# Patient Record
Sex: MALE | Race: WHITE
[De-identification: names, ages, dates, MRNs, and addresses within clinical notes are randomized per-mention and may not be internally consistent; named-entity substitution may affect disease eponyms.]

---

## 2022-02-02 ENCOUNTER — HOSPITAL ENCOUNTER (OUTPATIENT)
Dept: HOSPITAL 47 - ORWHC2ENDO | Age: 67
Discharge: HOME | End: 2022-02-02
Attending: INTERNAL MEDICINE
Payer: MEDICARE

## 2022-02-02 VITALS — HEART RATE: 48 BPM | SYSTOLIC BLOOD PRESSURE: 126 MMHG | DIASTOLIC BLOOD PRESSURE: 83 MMHG

## 2022-02-02 VITALS — TEMPERATURE: 97.6 F | RESPIRATION RATE: 16 BRPM

## 2022-02-02 VITALS — BODY MASS INDEX: 26.4 KG/M2

## 2022-02-02 DIAGNOSIS — Z12.11: Primary | ICD-10-CM

## 2022-02-02 PROCEDURE — 88305 TISSUE EXAM BY PATHOLOGIST: CPT

## 2022-02-02 PROCEDURE — 45385 COLONOSCOPY W/LESION REMOVAL: CPT

## 2022-02-02 NOTE — P.PCN
Date of Procedure: 02/02/22


Procedure(s) Performed: 


BRIEF HISTORY: Patient is a 66-year-old pleasant white male scheduled for an 

elective colonoscopy as a part of screening for colorectal neoplasia.





PROCEDURE PERFORMED: Colonoscopy with biopsy. 





PREOPERATIVE DIAGNOSIS: Screening for colon cancer. 





IV sedation per Anesthesia. 





PROCEDURE: After informed consent was obtained, the patient, was brought into 

the endoscopy unit. IV sedation was administered by Anesthesia under continuous 

monitoring.  Digital rectal examination was normal. Initially the Olympus CF-160

flexible video colonoscope was then inserted in the rectum, gradually advanced 

into the cecum without any difficulty. Careful examination was performed as the 

scope was gradually being withdrawn. Ileocecal valve and the appendiceal orifice

were visualized and appeared normal.  Prep was excellent. Mucosa of the cecum, 

ascending colon, appeared normal.  In the transverse colon there was a 3-4 mm 

sessile polyp that was removed by cold biopsy.  Rest of the transverse colon, 

descending colon, sigmoid colon, and rectum appeared normal.  Scattered sigmoid 

diverticulosis seen.  Retroflexion was performed in the rectum and small 

internal were seen. The patient tolerated the procedure well. 





IMPRESSION: 


3-4 mm sessile transverse colon polyp status post cold biopsy


Scattered sigmoid diverticulosis 


Small internal hemorrhoids








RECOMMENDATIONS:  Findings of this examination were discussed with the patient 

as well as his family..He was advised to follow with the biopsy results.  If the

biopsy reveals adenoma he can have a repeat colonoscopy in 5 years

## 2023-07-16 ENCOUNTER — HOSPITAL ENCOUNTER (EMERGENCY)
Dept: HOSPITAL 47 - EC | Age: 68
Discharge: HOME | End: 2023-07-16
Payer: MEDICAID

## 2023-07-16 VITALS — TEMPERATURE: 98.2 F | RESPIRATION RATE: 18 BRPM

## 2023-07-16 VITALS — HEART RATE: 54 BPM | DIASTOLIC BLOOD PRESSURE: 93 MMHG | SYSTOLIC BLOOD PRESSURE: 143 MMHG

## 2023-07-16 DIAGNOSIS — S61.432A: Primary | ICD-10-CM
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
## 2023-07-16 DIAGNOSIS — Z23: ICD-10-CM

## 2023-07-16 PROCEDURE — 90471 IMMUNIZATION ADMIN: CPT

## 2023-07-16 PROCEDURE — 99282 EMERGENCY DEPT VISIT SF MDM: CPT

## 2023-07-16 PROCEDURE — 90715 TDAP VACCINE 7 YRS/> IM: CPT

## 2023-07-16 NOTE — ED
Skin/Abscess/FB HPI





- General


Chief complaint: Skin/Abscess/Foreign Body


Stated complaint: foreign object


Time Seen by Provider: 07/16/23 07:41


Source: patient, RN notes reviewed, old records reviewed


Mode of arrival: ambulatory


Limitations: no limitations





- History of Present Illness


Initial comments: 





68-year-old well-appearing male presents to the emergency room with a puncture 

wound from a nail that he sustained yesterday at 3 PM.  Patient states was 

removing deck boards at the time.  Increased pain and swelling today.  States 

his wife is a nurse and was told to come in for a tetanus shot.  Denies any 

other injuries. 


MD complaint: other (puncture wound)


-: hour(s) (15)


Tetanus Up to Date: no


Location: L hand (thenar surface left hand)


Severity scale (1-10): 2


Quality: aching


Improves with: none


Worsens with: movement


Context: other (puncture from nail)


Treatments Prior to Arrival: bandages





- Related Data


                                Home Medications











 Medication  Instructions  Recorded  Confirmed


 


Immune Defense 1 tab PO DAILY 01/28/22 


 


Prostate Supplement 1 tab PO DAILY 01/28/22 








                                  Previous Rx's











 Medication  Instructions  Recorded


 


Cephalexin [Keflex] 500 mg PO Q6HR 7 Days #28 cap 07/16/23











                                    Allergies











Allergy/AdvReac Type Severity Reaction Status Date / Time


 


No Known Allergies Allergy   Verified 07/16/23 07:50














Review of Systems


ROS Statement: 


Those systems with pertinent positive or pertinent negative responses have been 

documented in the HPI.





ROS Other: All systems not noted in ROS Statement are negative.





Past Medical History


Past Medical History: Prostate Disorder


Additional Past Medical History / Comment(s): BPH


History of Any Multi-Drug Resistant Organisms: None Reported


Past Surgical History: No Surgical Hx Reported


Additional Past Surgical History / Comment(s): colonoscopy


Past Anesthesia/Blood Transfusion Reactions: No Reported Reaction


Past Psychological History: No Psychological Hx Reported


Smoking Status: Never smoker


Past Alcohol Use History: Rare


Past Drug Use History: None Reported





- Past Family History


  ** Mother


Family Medical History: Diabetes Mellitus





General Exam


Limitations: no limitations


General appearance: alert, in no apparent distress


Head exam: Present: atraumatic


Eye exam: Present: normal appearance.  Absent: scleral icterus, periorbital 

swelling


Respiratory exam: Absent: respiratory distress, accessory muscle use


Cardiovascular Exam: Present: bradycardia


  ** Left


Forearm Wrist exam: Present: full ROM


Hand Wrist exam: Present: full ROM, tenderness (thenar surface), other (puncture

 wound)


Neuro motor exam: Present: wrist extension intact, thumb opposition intact, 

thumb IP flexion intact, thumb adduction intact, fingers 2-5 abduction intact


Neurosensory exam: Present: radial nerve intact, ulnar nerve intact, median 

nerve intact


Vascular: Present: normal capillary refill.  Absent: vascular compromise


Neurological exam: Present: alert, oriented X3, normal gait


Psychiatric exam: Present: normal affect, normal mood


Skin exam: Present: warm, dry, normal color.  Absent: cyanosis, diaphoretic, 

petechiae, pallor





Course


                                   Vital Signs











  07/16/23 07/16/23





  07:45 08:34


 


Temperature 98.2 F 


 


Pulse Rate 57 L 54 L


 


Respiratory 18 18





Rate  


 


Blood Pressure 124/85 143/93


 


O2 Sat by Pulse 95 97





Oximetry  














Medical Decision Making





- Medical Decision Making











Was pt. sent in by a medical professional or institution ([facility], NP, urgent 

care, hospital, or nursing home...) When possible be specific


@  -[No]


Did you speak to anyone other than the patient for history (EMS, parent, family,

 police, friend...)? What history was obtained from this source 


@  -[No]


Did you review nursing and triage notes (agree or disagree)?  Why? 


@  -[I reviewed and agree with nursing and triage notes]


Were old charts reviewed (outside hosp., previous admission, EMS record, old 

EKG, old radiological studies, urgent care reports/EKG's, nursing home records)?

Report findings 


@  -[No old charts were reviewed]


Differential Diagnosis (chest pain, altered mental status, abdominal pain women,

abdominal pain men, vaginal bleeding, weakness, fever, dyspnea, syncope, 

headache, dizziness, GI bleed, back pain, seizure, CVA, palpatations, mental 

health, musculoskeletal)? 


@  foreign body, cellulitis, fracture   


EKG interpreted by me (3pts min.).


@  -n/a


X-rays interpreted by me (1pt min.).


@  -[None done]


CT interpreted by me (1pt min.).


@  -[None done]


U/S interpreted by me (1pt. min.).


@  -[None done]


What testing was considered but not performed or refused? (CT, X-rays, U/S, 

labs)? Why?


@  -xr considered however pt states puncture was superficial and has full range 

of motion


What meds were considered but not given or refused? Why?


@  -[None]


Did you discuss the management of the patient with other professionals 

(professionals i.e. , PA, NP, lab, RT, psych nurse, , , 

teacher, , )? Give summary


@  -[No]


Was smoking cessation discussed for >3mins.?


@  -[No]


Was critical care preformed (if so, how long)?


@  -[No]


Were there social determinants of health that impacted care today? How? 

(Homelessness, low income, unemployed, alcoholism, drug addiction, 

transportation, low edu. Level, literacy, decrease access to med. care, longterm, r

ehab)?


@  -[No]


Was there de-escalation of care discussed even if they declined (Discuss DNR or 

withdrawal of care, Hospice)? DNR status


@  -[No]


What co-morbidities impacted this encounter? (DM, HTN, Smoking, COPD, CAD, 

Cancer, CVA, ARF, Chemo, Hep., AIDS, mental health diagnosis, sleep apnea, 

morbid obesity)?


@  -DM


Was patient admitted / discharged? Hospital course, mention meds given and 

route, prescriptions, significant lab abnormalities, going to OR and other 

pertinent info.


@  -discharged


 68-year-old well-appearing male presents to the emergency room with a puncture 

wound from a nail that he sustained yesterday at 3 PM.  Patient states was 

removing deck boards at the time.  Increased pain and swelling today.  States 

his wife is a nurse and was told to come in for a tetanus shot.  Denies any 

other injuries. 


Patient was given a tetanus shot and a dose of antibiotics.  Prescribed Keflex 

and instructed to follow-up is primary care doctor next week.  Directed to soak 

hand in warm soapy water twice a day for next 2 days. Return with a concerning 

symptoms including increased redness, drainage, pain or fevers.  He is agreeable

to this plan of care.  Case discussed with Dr. Lau.  Medical history of 

diabetes and BPH


Undiagnosed new problem with uncertain prognosis?


@  -[No]


Drug Therapy requiring intensive monitoring for toxicity (Heparin, Nitro, 

Insulin, Cardizem)?


@  -[No]


Were any procedures done?


@  -[No]


Diagnosis/symptom?


@  puncture wound


Acute, or Chronic, or Acute on Chronic?


@  acute


Uncomplicated (without systemic symptoms) or Complicated (systemic symptoms)?


@  -uncomplicated


Side effects of treatment?


@  -[No]


Exacerbation, Progression, or Severe Exacerbation?


@ n/a


Poses a threat to life or bodily function? How? (Chest pain, USA, MI, pneumonia,

 PE, COPD, DKA, ARF, appy, cholecystitis, CVA, Diverticulitis, Homicidal, 

Suicidal, threat to staff... and all critical care pts)


@  -[no








Disposition


Clinical Impression: 


 Puncture wound of left hand with infection





Disposition: HOME SELF-CARE


Condition: Good


Instructions (If sedation given, give patient instructions):  Puncture Wound 

(ED), Cellulitis (ED)


Additional Instructions: 


Take antibiotics as prescribed.  Soak hand in warm soapy water twice a day for 

the next 2 days.  Placed a layer of bacitracin and cover wound for the next 2 

days.  Follow-up with the primary care doctor next week.  Return to the 

emergency room with any new or concerning symptoms including fever, increased 

pain or drainage.


Prescriptions: 


Cephalexin [Keflex] 500 mg PO Q6HR 7 Days #28 cap


Is patient prescribed a controlled substance at d/c from ED?: No


Referrals: 


Adilson Scott DO [Primary Care Provider] - 1-2 days


Time of Disposition: 08:13